# Patient Record
Sex: MALE
[De-identification: names, ages, dates, MRNs, and addresses within clinical notes are randomized per-mention and may not be internally consistent; named-entity substitution may affect disease eponyms.]

---

## 2019-01-09 PROBLEM — Z00.00 ENCOUNTER FOR PREVENTIVE HEALTH EXAMINATION: Status: ACTIVE | Noted: 2019-01-09

## 2019-01-18 ENCOUNTER — APPOINTMENT (OUTPATIENT)
Dept: VASCULAR SURGERY | Facility: CLINIC | Age: 54
End: 2019-01-18
Payer: COMMERCIAL

## 2019-01-18 VITALS
DIASTOLIC BLOOD PRESSURE: 80 MMHG | SYSTOLIC BLOOD PRESSURE: 110 MMHG | HEIGHT: 74 IN | BODY MASS INDEX: 30.16 KG/M2 | WEIGHT: 235 LBS

## 2019-01-18 DIAGNOSIS — F17.200 NICOTINE DEPENDENCE, UNSPECIFIED, UNCOMPLICATED: ICD-10-CM

## 2019-01-18 PROCEDURE — 93978 VASCULAR STUDY: CPT

## 2019-01-18 PROCEDURE — 99203 OFFICE O/P NEW LOW 30 MIN: CPT

## 2019-01-18 NOTE — HISTORY OF PRESENT ILLNESS
[FreeTextEntry1] : 52 y/o gentleman with erectile dysfunction for the past 5 years, never been evaluated by a Urologist, sent in by PMD for vascular evaluation.

## 2019-01-18 NOTE — DATA REVIEWED
[FreeTextEntry1] : I performed an arterial duplex which was medically necessary to evaluate for iliac artery stenosis. It showed patent aorta, iliac arteries and femoral arteries bilaterally.\par

## 2019-01-18 NOTE — ASSESSMENT
[FreeTextEntry1] : 54 y/o gentleman with erectile dysfunction for the past 5 years, never been evaluated by a Urologist, sent in by PMD for vascular evaluation. I performed an arterial duplex which was medically necessary to evaluate for iliac artery stenosis. It showed patent aorta, iliac arteries and femoral arteries bilaterally. I have informed him of the test results and advised Urology consultation. No vascular surgical intervention is needed.\par

## 2019-05-22 ENCOUNTER — APPOINTMENT (OUTPATIENT)
Dept: UROLOGY | Facility: CLINIC | Age: 54
End: 2019-05-22
Payer: COMMERCIAL

## 2019-05-22 VITALS
WEIGHT: 235 LBS | HEIGHT: 74 IN | DIASTOLIC BLOOD PRESSURE: 72 MMHG | HEART RATE: 104 BPM | BODY MASS INDEX: 30.16 KG/M2 | SYSTOLIC BLOOD PRESSURE: 114 MMHG

## 2019-05-22 PROCEDURE — 99203 OFFICE O/P NEW LOW 30 MIN: CPT

## 2019-05-22 RX ORDER — HYDROCHLOROTHIAZIDE 25 MG/1
25 TABLET ORAL
Qty: 90 | Refills: 0 | Status: ACTIVE | COMMUNITY
Start: 2018-12-13

## 2019-05-22 NOTE — LETTER BODY
[Dear  ___] : Dear  [unfilled], [Consult Letter:] : I had the pleasure of evaluating your patient, [unfilled]. [Please see my note below.] : Please see my note below. [Consult Closing:] : Thank you very much for allowing me to participate in the care of this patient.  If you have any questions, please do not hesitate to contact me. [Sincerely,] : Sincerely, [FreeTextEntry2] : Dr. Orion Garcia\par [address]\[place] [address]

## 2019-05-22 NOTE — PHYSICAL EXAM
[General Appearance - Well Developed] : well developed [General Appearance - Well Nourished] : well nourished [Normal Appearance] : normal appearance [Well Groomed] : well groomed [General Appearance - In No Acute Distress] : no acute distress [Edema] : no peripheral edema [Respiration, Rhythm And Depth] : normal respiratory rhythm and effort [Exaggerated Use Of Accessory Muscles For Inspiration] : no accessory muscle use [Abdomen Soft] : soft [Abdomen Tenderness] : non-tender [Costovertebral Angle Tenderness] : no ~M costovertebral angle tenderness [Urethral Meatus] : meatus normal [Urinary Bladder Findings] : the bladder was normal on palpation [Scrotum] : the scrotum was normal [Testes Mass (___cm)] : there were no testicular masses [No Prostate Nodules] : no prostate nodules [Normal Station and Gait] : the gait and station were normal for the patient's age [] : no rash [No Focal Deficits] : no focal deficits [Oriented To Time, Place, And Person] : oriented to person, place, and time [Affect] : the affect was normal [Mood] : the mood was normal [Not Anxious] : not anxious [No Palpable Adenopathy] : no palpable adenopathy [Penis Abnormality] : normal circumcised penis [Testes Tenderness] : no tenderness of the testes [Anus Abnormality] : the anus and perineum were normal [Rectal Exam - Rectum] : digital rectal exam was normal [Prostate Enlargement] : the prostate was not enlarged [Prostate Tenderness] : the prostate was not tender [Prostate Size ___ gm] : prostate size [unfilled] gm [FreeTextEntry1] : 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

## 2019-05-22 NOTE — LETTER HEADER
[FreeTextEntry3] : Brandon Fall M.D.\par Director of Urology\par [facility]\par [address] [place] [address], [address]\[place] [address]

## 2019-05-22 NOTE — HISTORY OF PRESENT ILLNESS
[Erectile Dysfunction] : Erectile Dysfunction [None] : None [FreeTextEntry1] : Jarod is a 54-year-old male who presents for evaluation of worsening erectile dysfunction over the last 13 years.\par \par He is experiencing with difficulty with both obtaining and maintaining an erection. Additionally he reports decreased libido and energy. He has tried Viagra in the past with some relief however, he still had early detumescence and then eventually even the limited rigidity he was getting stopped. He stopped trying get after three or four times at the hundred milligrams per dose.\par \par There was a question of iliac artery stenosis and he was seen by vascular which ruled that out.\par \par He presents today for evaluation\par  [Currently Experiencing ___] :  [unfilled]

## 2019-05-22 NOTE — ASSESSMENT
[FreeTextEntry1] : Physical examination Shows penile atrophy with some Peyronie's. He has tried sildenafil years ago with minimal improvement. He needs a complete hormonal panel and [place] criteria.\par \par If his hormone panel is within normal limits we will consider penile Doppler for further evaluation.

## 2019-07-29 ENCOUNTER — APPOINTMENT (OUTPATIENT)
Dept: UROLOGY | Facility: CLINIC | Age: 54
End: 2019-07-29
Payer: COMMERCIAL

## 2019-07-29 PROCEDURE — 99213 OFFICE O/P EST LOW 20 MIN: CPT

## 2019-07-29 NOTE — PHYSICAL EXAM
[General Appearance - Well Developed] : well developed [General Appearance - Well Nourished] : well nourished [Normal Appearance] : normal appearance [Well Groomed] : well groomed [General Appearance - In No Acute Distress] : no acute distress [Edema] : no peripheral edema [] : no respiratory distress [Respiration, Rhythm And Depth] : normal respiratory rhythm and effort [Exaggerated Use Of Accessory Muscles For Inspiration] : no accessory muscle use [Oriented To Time, Place, And Person] : oriented to person, place, and time [Affect] : the affect was normal [Normal Station and Gait] : the gait and station were normal for the patient's age [Not Anxious] : not anxious [Mood] : the mood was normal [No Focal Deficits] : no focal deficits

## 2019-07-29 NOTE — LETTER HEADER
[FreeTextEntry3] : Brandon Fall M.D.\par Director of Urology\par [facility]\par [address] [place] [address], [address]\[place] [address]

## 2019-07-29 NOTE — LETTER BODY
[Dear  ___] : Dear  [unfilled], [Courtesy Letter:] : I had the pleasure of seeing your patient, [unfilled], in my office today. [Please see my note below.] : Please see my note below. [Sincerely,] : Sincerely, [Consult Closing:] : Thank you very much for allowing me to participate in the care of this patient.  If you have any questions, please do not hesitate to contact me. [FreeTextEntry2] : Dr. Orion Garcia\par [address]\[place] [address]

## 2019-07-29 NOTE — ASSESSMENT
[FreeTextEntry1] : He is hypogonadal however this is a single sample and as we explained to him insurance companies won’t pay and I think rightfully so it must be documented is not a sampling error. In addition he needs to get [place] criteria classification, not the study results that were done but the interpretation thereof. I did time he gets one he will hopefully have the other and then we can decide on therapy. He understands that if he is hypogonadal the PDE five inhibitors don’t work as well and that may be the reason for his progressive failure. However they can be true true and nonrelated and if we make him into a normal testosterone level patient and the PDE five inhibitors still don’t work he will have to consider alternative options.

## 2019-07-29 NOTE — HISTORY OF PRESENT ILLNESS
[Currently Experiencing ___] :  [unfilled] [Erectile Dysfunction] : Erectile Dysfunction [None] : None [FreeTextEntry1] : Jarod is a 54-year-old male who we saw for worsening erectile dysfunction over the last 13 years.\par \par He is experiencing with difficulty with both obtaining and maintaining an erection with decreased libido and energy. He has tried Viagra in the past with some relief however, he still had early detumescence and then eventually even the limited rigidity he was getting stopped. He stopped trying after three or four attempts at the hundred milligrams dose.\par \par He presents today to review his blood work. He did not Get [place] criteria, although he did have a full work up by cardiology.\par

## 2019-10-16 ENCOUNTER — APPOINTMENT (OUTPATIENT)
Dept: UROLOGY | Facility: CLINIC | Age: 54
End: 2019-10-16
Payer: COMMERCIAL

## 2019-10-16 VITALS
DIASTOLIC BLOOD PRESSURE: 91 MMHG | HEIGHT: 74 IN | BODY MASS INDEX: 30.16 KG/M2 | SYSTOLIC BLOOD PRESSURE: 132 MMHG | WEIGHT: 235 LBS | HEART RATE: 105 BPM

## 2019-10-16 PROCEDURE — 99213 OFFICE O/P EST LOW 20 MIN: CPT

## 2019-10-16 NOTE — ASSESSMENT
[FreeTextEntry1] : His hormones are low on a second round of testing and we reviewed all the options available. He is electing to begin testosterone therapy. We discussed different modalities and he is electing for gel. He will begin AndroGel 1.62% @ 2 pumps per day. He will get blood work 3 weeks after starting the medication and follow up the week after for review. Additionally he will begin sildenafil 100 mg p.o. as needed one hour prior to intercourse after giving the gel a week or two to get to a good blood level. All usage, dosage, mechanism of action, and adverse events were reviewed. He understands not take more than one tablet in a 24-hour period.

## 2019-10-16 NOTE — PHYSICAL EXAM

## 2019-10-16 NOTE — HISTORY OF PRESENT ILLNESS
[Currently Experiencing ___] :  [unfilled] [Erectile Dysfunction] : Erectile Dysfunction [None] : None [FreeTextEntry1] : Jarod is a 54-year-old male who we have been following for worsening erectile dysfunction over the last 13 years and low testosterone.\par \par He has difficulty with both obtaining and maintaining an erection with decreased libido and energy. He has tried Viagra 100 mg without success. His last blood work showed low testosterone levels and he presents today to review his repeat blood work.\par \par Additionally he has his [place] criteria for review.

## 2019-10-16 NOTE — LETTER HEADER
[FreeTextEntry3] : Brandon Fall M.D.\par Director of Urology\par [facility]\par [address] [place] [address], [address]\[place] [address]

## 2019-10-16 NOTE — LETTER BODY
[Dear  ___] : Dear  [unfilled], [Courtesy Letter:] : I had the pleasure of seeing your patient, [unfilled], in my office today. [Please see my note below.] : Please see my note below. [Consult Closing:] : Thank you very much for allowing me to participate in the care of this patient.  If you have any questions, please do not hesitate to contact me. [Sincerely,] : Sincerely, [FreeTextEntry2] : Dr. Orion Garcia\par [address]\[place] [address]

## 2019-11-13 ENCOUNTER — APPOINTMENT (OUTPATIENT)
Dept: UROLOGY | Facility: CLINIC | Age: 54
End: 2019-11-13
Payer: COMMERCIAL

## 2019-11-13 VITALS
BODY MASS INDEX: 30.16 KG/M2 | WEIGHT: 235 LBS | HEART RATE: 111 BPM | SYSTOLIC BLOOD PRESSURE: 136 MMHG | DIASTOLIC BLOOD PRESSURE: 87 MMHG | HEIGHT: 74 IN

## 2019-11-13 PROCEDURE — 99213 OFFICE O/P EST LOW 20 MIN: CPT

## 2019-11-13 NOTE — HISTORY OF PRESENT ILLNESS
[FreeTextEntry1] : Jarod was started on AndroGel he was supposed to get blood drawn in three weeks and see me in four he had blood drawn November 9 is now here for days later. Please note he is not try the sildenafil though he’s already been using the AndroGel for several weeks. As he put it in his wife agreed she just wasn’t available as she wasn’t feeling that well.

## 2019-11-13 NOTE — LETTER HEADER
[FreeTextEntry3] : Brandon Fall M.D.\par Director of Urology\par [facility]\par [address] [place] [address], [address]\[place] [address]

## 2019-11-13 NOTE — ASSESSMENT
[FreeTextEntry1] : Physical exam shows no signs of fluid retention\par He is going away until Monday. He will call us that and depending on the testosterone levels we may decide to repeat the bloods or he may just stay at two pumps per day. His wife’s tells me now that she’s feeling better enough that they can try sexual activity I Jarod doesn’t know how much that will be true but if it’s so will try. Why now giving him an appointment for a month from now if the testosterone levels are good will just a on two pumps per day will see if we can get him a three-month supply if the sildenafil works great if not will look at other options.\par

## 2019-11-13 NOTE — LETTER BODY
[Dear  ___] : Dear  [unfilled], [Please see my note below.] : Please see my note below. [Courtesy Letter:] : I had the pleasure of seeing your patient, [unfilled], in my office today. [FreeTextEntry2] : Dr. Orion Garcia\par [address]\[place] [address] [Sincerely,] : Sincerely,

## 2019-11-13 NOTE — PHYSICAL EXAM
[Normal Appearance] : normal appearance [General Appearance - Well Nourished] : well nourished [General Appearance - Well Developed] : well developed [Well Groomed] : well groomed [General Appearance - In No Acute Distress] : no acute distress [Abdomen Soft] : soft [Abdomen Tenderness] : non-tender [Costovertebral Angle Tenderness] : no ~M costovertebral angle tenderness [Heart Rate And Rhythm] : Heart rate and rhythm were normal [FreeTextEntry1] : mild edema [] : no respiratory distress [Respiration, Rhythm And Depth] : normal respiratory rhythm and effort [Exaggerated Use Of Accessory Muscles For Inspiration] : no accessory muscle use [Auscultation Breath Sounds / Voice Sounds] : lungs were clear to auscultation bilaterally [Oriented To Time, Place, And Person] : oriented to person, place, and time [Not Anxious] : not anxious [Affect] : the affect was normal [Mood] : the mood was normal [Normal Station and Gait] : the gait and station were normal for the patient's age

## 2019-12-11 ENCOUNTER — APPOINTMENT (OUTPATIENT)
Dept: UROLOGY | Facility: CLINIC | Age: 54
End: 2019-12-11

## 2019-12-27 ENCOUNTER — APPOINTMENT (OUTPATIENT)
Dept: UROLOGY | Facility: CLINIC | Age: 54
End: 2019-12-27
Payer: COMMERCIAL

## 2019-12-27 VITALS
BODY MASS INDEX: 30.16 KG/M2 | HEIGHT: 74 IN | HEART RATE: 104 BPM | WEIGHT: 235 LBS | SYSTOLIC BLOOD PRESSURE: 141 MMHG | DIASTOLIC BLOOD PRESSURE: 93 MMHG

## 2019-12-27 PROCEDURE — 99213 OFFICE O/P EST LOW 20 MIN: CPT

## 2019-12-27 RX ORDER — SILDENAFIL 100 MG/1
100 TABLET, FILM COATED ORAL
Qty: 10 | Refills: 2 | Status: COMPLETED | OUTPATIENT
Start: 2019-10-16 | End: 2019-12-27

## 2019-12-27 NOTE — LETTER HEADER
[FreeTextEntry3] : Brandon Fall M.D.\par Director of Urology\par [facility]\par [address] [place] [address], [address]\[place] [address]

## 2019-12-27 NOTE — HISTORY OF PRESENT ILLNESS
[FreeTextEntry1] : Jarod is a 54-year-old [place] American male last seen on November 13. At that point he had been on AndroGel we did not have all the bloods back he was going to continue with two pumps per day contact us a few days later for levels were good enough try the sildenafil several times going up to 100 mg. He’s done that his blood levels were good, please see the results section, and he says he with the hundred milligrams of sildenafil the penis is not working. His libido is good he enjoys having his genitalia played with he gets semi hard but it’s not enough to use.\par \par He also complains of some puffiness where he’s putting the AndroGel, please note he’s putting it on the anterior upper chest wall not his shoulders and wonders what’s going on.\par  [Currently Experiencing ___] :  [unfilled] [Erectile Dysfunction] : Erectile Dysfunction [None] : None

## 2019-12-27 NOTE — ASSESSMENT
[FreeTextEntry1] : Physical exam shows no palpable or visible abnormality in his chest wall it’s not the breast tissue is not worried about that it’s in the area between the breast and the shoulder. I don’t see anything and he acknowledges his wife’s looked at it and she doesn’t see any different. I did correct where he supposed to put the AndroGel and will see if that makes any difference in his lab values but they may question is what to do about his penis.\par \par He could try other PD five inhibitors though I haven’t seen one work better than the other if we give him Cialis and it works that’s a lot better than alternatives. We will try that having them get it from men M.D. because it’s the least expensive method I have of getting it unless he has an insurance that pays for it, he will let us know if that works if it does all well and good if it doesn’t work then he’d have to consider injection therapy a vacuum device or an implant.\par

## 2019-12-27 NOTE — PHYSICAL EXAM
[Abdomen Soft] : soft [Abdomen Tenderness] : non-tender [] : no respiratory distress [Costovertebral Angle Tenderness] : no ~M costovertebral angle tenderness [Oriented To Time, Place, And Person] : oriented to person, place, and time [Respiration, Rhythm And Depth] : normal respiratory rhythm and effort [Exaggerated Use Of Accessory Muscles For Inspiration] : no accessory muscle use [Not Anxious] : not anxious [Mood] : the mood was normal [Affect] : the affect was normal [Normal Station and Gait] : the gait and station were normal for the patient's age

## 2019-12-27 NOTE — LETTER BODY
[Dear  ___] : Dear  [unfilled], [Please see my note below.] : Please see my note below. [Courtesy Letter:] : I had the pleasure of seeing your patient, [unfilled], in my office today. [Consult Closing:] : Thank you very much for allowing me to participate in the care of this patient.  If you have any questions, please do not hesitate to contact me. [Sincerely,] : Sincerely, [FreeTextEntry2] : Dr. Orion Garcia\par [address]\[place] [address]

## 2020-02-26 ENCOUNTER — APPOINTMENT (OUTPATIENT)
Dept: UROLOGY | Facility: CLINIC | Age: 55
End: 2020-02-26
Payer: COMMERCIAL

## 2020-02-26 VITALS
BODY MASS INDEX: 30.16 KG/M2 | DIASTOLIC BLOOD PRESSURE: 83 MMHG | HEIGHT: 74 IN | WEIGHT: 235 LBS | SYSTOLIC BLOOD PRESSURE: 147 MMHG | HEART RATE: 111 BPM

## 2020-02-26 PROCEDURE — 99213 OFFICE O/P EST LOW 20 MIN: CPT

## 2020-04-29 ENCOUNTER — APPOINTMENT (OUTPATIENT)
Dept: UROLOGY | Facility: CLINIC | Age: 55
End: 2020-04-29
Payer: COMMERCIAL

## 2020-04-29 VITALS
DIASTOLIC BLOOD PRESSURE: 89 MMHG | SYSTOLIC BLOOD PRESSURE: 134 MMHG | HEIGHT: 74 IN | WEIGHT: 235 LBS | HEART RATE: 111 BPM | BODY MASS INDEX: 30.16 KG/M2 | TEMPERATURE: 98 F

## 2020-04-29 PROCEDURE — 99213 OFFICE O/P EST LOW 20 MIN: CPT

## 2020-04-29 NOTE — LETTER HEADER
[FreeTextEntry3] : Brandon Fall M.D.\par Director of Urology\par [facility]\par [address] [place] [address], [address]\[place] [address]

## 2020-04-29 NOTE — PHYSICAL EXAM
[General Appearance - Well Developed] : well developed [Well Groomed] : well groomed [Normal Appearance] : normal appearance [General Appearance - Well Nourished] : well nourished [FreeTextEntry1] : miold obesity [General Appearance - In No Acute Distress] : no acute distress [Abdomen Tenderness] : non-tender [Abdomen Soft] : soft [Costovertebral Angle Tenderness] : no ~M costovertebral angle tenderness [Edema] : no peripheral edema [] : no respiratory distress [Exaggerated Use Of Accessory Muscles For Inspiration] : no accessory muscle use [Respiration, Rhythm And Depth] : normal respiratory rhythm and effort [Oriented To Time, Place, And Person] : oriented to person, place, and time [Affect] : the affect was normal [Mood] : the mood was normal [Not Anxious] : not anxious [Normal Station and Gait] : the gait and station were normal for the patient's age

## 2020-04-29 NOTE — ASSESSMENT
[FreeTextEntry1] : His physical exam shows no signs of fluid retention these numbers don’t make sense I renewed the medication racing him to four pumps per day but he’s can get blood drawn tomorrow morning still on two pumps per day and will have it telemedicine visit after the bloods come back.

## 2020-04-29 NOTE — HISTORY OF PRESENT ILLNESS
[Currently Experiencing ___] :  [unfilled] [FreeTextEntry1] : Jarod is a 54-year-old -American male who was last seen on [place] 26, 2020. We’ve been treating for erectile dysfunction and low testosterone we had put him on AndroGel at two pumps per day and bloods in November 2019 at show decent values at 526, 80.6 and 148 with an estradiol of 44. We would continuing the medication to see how he was doing just to make sure he didn’t need a little bit more and then if he still didn’t respond to PDE five inhibitors we go for a Doppler. We had wanted him to repeat the bloods for the visit in February but he forgot so we figured we see in the next time. The visit was delayed because of the covid pandemic and is here today.\par \par He had bloods done on March 24, 2020 telling me he is still using two pumps per day. He feels no change in libido on his penis still doesn’t work. He is here for review as he told me he could not to Tele med.\par  [None] : None [Erectile Dysfunction] : Erectile Dysfunction

## 2020-04-29 NOTE — LETTER HEADER
[FreeTextEntry3] : Brandon Fall M.D.\par Director of Urology\par [facility]\par [address] [place] [address], [address]\[place] [address]

## 2020-04-29 NOTE — HISTORY OF PRESENT ILLNESS
[Currently Experiencing ___] :  [unfilled] [Erectile Dysfunction] : Erectile Dysfunction [None] : None [FreeTextEntry1] : Jarod is a 54-year-old male who had been following for hypogonadism and erectile dysfunction. He has been on AndroGel 2 pumps daily with some improvement in his libido/energy. He did not obtain repeat blood work. At his last visit we gave him Cialis 20 mg and he reports no significant improvement in his erectile quality on both AndroGel and Cialis.\par \par \par

## 2020-04-29 NOTE — LETTER BODY
[Dear  ___] : Dear  [unfilled], [Courtesy Letter:] : I had the pleasure of seeing your patient, [unfilled], in my office today. [Please see my note below.] : Please see my note below. [Consult Closing:] : Thank you very much for allowing me to participate in the care of this patient.  If you have any questions, please do not hesitate to contact me. [Sincerely,] : Sincerely, [FreeTextEntry2] : Dr. Orion Garcia\par [address]\[place] [address]

## 2020-04-29 NOTE — ASSESSMENT
[FreeTextEntry1] : He will get blood work to see how his hormones are on 2 puffs of AndroGel.\par \par Concerning his ED, it is refractory to PDE 5 inhibitors and we will obtain a Doppler study for further evaluation.

## 2020-04-29 NOTE — LETTER BODY
[Dear  ___] : Dear  [unfilled], [Courtesy Letter:] : I had the pleasure of seeing your patient, [unfilled], in my office today. [Please see my note below.] : Please see my note below. [FreeTextEntry2] : Dr. Orion Garcia\par [address]\[place] [address] [Sincerely,] : Sincerely,

## 2020-05-06 DIAGNOSIS — R68.82 DECREASED LIBIDO: ICD-10-CM

## 2020-05-06 DIAGNOSIS — E28.0 ESTROGEN EXCESS: ICD-10-CM

## 2020-05-06 DIAGNOSIS — R79.89 OTHER SPECIFIED ABNORMAL FINDINGS OF BLOOD CHEMISTRY: ICD-10-CM

## 2020-05-07 ENCOUNTER — APPOINTMENT (OUTPATIENT)
Dept: UROLOGY | Facility: CLINIC | Age: 55
End: 2020-05-07
Payer: COMMERCIAL

## 2020-05-07 PROBLEM — R79.89 DECREASED TESTOSTERONE LEVEL: Status: ACTIVE | Noted: 2019-07-29

## 2020-05-07 PROBLEM — E28.0 ESTRADIOL EXCESS: Status: ACTIVE | Noted: 2020-05-07

## 2020-05-07 PROBLEM — R68.82 DECREASED LIBIDO: Status: ACTIVE | Noted: 2019-05-22

## 2020-05-07 PROCEDURE — 99214 OFFICE O/P EST MOD 30 MIN: CPT | Mod: 95

## 2020-05-07 NOTE — ASSESSMENT
[FreeTextEntry1] : He tells me that he continued with the AndroGel he tried with the PD five inhibitors and failed.\par \par Because of the elevated estradiol and the fact that I do want to stop the testosterone as it is not that high but the estradiol is high that I want to leave and we will add in Arimidex at a half milligrams PO three times per week in what he understands is an off label use. He will continue the AndroGel, [facility] he gave me when he was here was an era and though he has incontinence yet he has a few more days left and he will contact us for [facility] tomorrow. He will get blood in three weeks and follow up in four so we could see how he is doing on the Arimidex and at that time hopefully the pandemic will have alleviated enough that we can proceed with a Doppler. He is aware that his partner should be available as number one if it works and he has a long-lasting erection he hasn’t had good sex in a while and why waste it. Number two, if it doesn’t work as well as we’d like it may work better when he goes home and has foreplay with his wife rather than having a test here with me. He is aware that he may get an erection that lasts too long and have to come back for reversal.

## 2020-05-07 NOTE — HISTORY OF PRESENT ILLNESS
[FreeTextEntry2] : he has received, reviewed and agreed to the telemedicine consent  [FreeTextEntry1] : We communicated with him via his wifes cell [phone]-for the am well (we had his email- [email or web address] but he did not have a laptop with audiovisual or desktop with audiovisual capacity) but when the audio did not work we switch to audio via His cell [phone] leaving the video on hers\par \par Jarod is a 54-year-old -American male last seen April 29. We been treating him for erectile dysfunction and low testosterone with AndroGel at two pumps per day and we had reissued him PD five inhibitors. He was supposed to get blood test so we could see if he had normalized his testosterone and kept it there and if so was a failure PD five inhibitors or if the levels have dropped down and we needed to adjust the dose. The blood test results made no sense so we arranged to have them drawn the next day he would continue on two pumps per day and we would have telemedicine after the bloods came back.

## 2020-05-07 NOTE — LETTER HEADER
[FreeTextEntry3] : Brandon Fall M.D.\par Director of Urology\par [facility]\par [address] [place] [address], [address]\[place] [address]

## 2020-06-04 ENCOUNTER — APPOINTMENT (OUTPATIENT)
Dept: UROLOGY | Facility: CLINIC | Age: 55
End: 2020-06-04

## 2020-06-23 RX ORDER — ANASTROZOLE TABLETS 1 MG/1
1 TABLET ORAL
Qty: 68 | Refills: 0 | Status: ACTIVE | COMMUNITY
Start: 2020-05-07 | End: 1900-01-01

## 2020-08-26 ENCOUNTER — APPOINTMENT (OUTPATIENT)
Dept: UROLOGY | Facility: CLINIC | Age: 55
End: 2020-08-26

## 2020-10-05 RX ORDER — TESTOSTERONE 16.2 MG/G
20.25 MG/ACT GEL TRANSDERMAL
Qty: 6 | Refills: 0 | Status: ACTIVE | COMMUNITY
Start: 2019-10-16 | End: 1900-01-01

## 2020-11-17 ENCOUNTER — RX RENEWAL (OUTPATIENT)
Age: 55
End: 2020-11-17

## 2020-12-09 ENCOUNTER — APPOINTMENT (OUTPATIENT)
Dept: UROLOGY | Facility: CLINIC | Age: 55
End: 2020-12-09

## 2021-04-12 ENCOUNTER — APPOINTMENT (OUTPATIENT)
Dept: UROLOGY | Facility: CLINIC | Age: 56
End: 2021-04-12

## 2021-06-14 ENCOUNTER — APPOINTMENT (OUTPATIENT)
Dept: UROLOGY | Facility: CLINIC | Age: 56
End: 2021-06-14

## 2021-09-26 NOTE — REASON FOR VISIT
b/l LE grossly >3/5 secondary to ability to STS and ambulate [Follow-up Visit ___] : a follow-up visit  for [unfilled]

## 2022-07-28 ENCOUNTER — APPOINTMENT (OUTPATIENT)
Dept: CARDIOLOGY | Facility: CLINIC | Age: 57
End: 2022-07-28

## 2022-07-28 VITALS
BODY MASS INDEX: 29 KG/M2 | SYSTOLIC BLOOD PRESSURE: 124 MMHG | WEIGHT: 226 LBS | DIASTOLIC BLOOD PRESSURE: 70 MMHG | TEMPERATURE: 98.7 F | HEART RATE: 107 BPM | HEIGHT: 74 IN

## 2022-07-28 DIAGNOSIS — R94.31 ABNORMAL ELECTROCARDIOGRAM [ECG] [EKG]: ICD-10-CM

## 2022-07-28 DIAGNOSIS — I10 ESSENTIAL (PRIMARY) HYPERTENSION: ICD-10-CM

## 2022-07-28 DIAGNOSIS — R01.1 CARDIAC MURMUR, UNSPECIFIED: ICD-10-CM

## 2022-07-28 DIAGNOSIS — R00.0 TACHYCARDIA, UNSPECIFIED: ICD-10-CM

## 2022-07-28 DIAGNOSIS — N52.9 MALE ERECTILE DYSFUNCTION, UNSPECIFIED: ICD-10-CM

## 2022-07-28 DIAGNOSIS — E78.00 PURE HYPERCHOLESTEROLEMIA, UNSPECIFIED: ICD-10-CM

## 2022-07-28 PROCEDURE — 93000 ELECTROCARDIOGRAM COMPLETE: CPT

## 2022-07-28 PROCEDURE — 99204 OFFICE O/P NEW MOD 45 MIN: CPT

## 2022-07-28 RX ORDER — TADALAFIL 5 MG/1
5 TABLET, FILM COATED ORAL
Refills: 0 | Status: ACTIVE | COMMUNITY

## 2022-07-28 RX ORDER — GEMFIBROZIL 600 MG/1
600 TABLET, FILM COATED ORAL TWICE DAILY
Refills: 0 | Status: ACTIVE | COMMUNITY
Start: 2019-01-03

## 2022-07-28 RX ORDER — TRAZODONE HYDROCHLORIDE 50 MG/1
50 TABLET ORAL
Qty: 30 | Refills: 0 | Status: DISCONTINUED | COMMUNITY
Start: 2019-02-23 | End: 2022-07-28

## 2022-07-28 RX ORDER — TAMSULOSIN HYDROCHLORIDE 0.4 MG/1
0.4 CAPSULE ORAL
Qty: 90 | Refills: 0 | Status: DISCONTINUED | COMMUNITY
Start: 2018-12-19 | End: 2022-07-28

## 2022-07-28 RX ORDER — TADALAFIL 5 MG/1
5 TABLET ORAL
Qty: 180 | Refills: 0 | Status: ACTIVE | COMMUNITY
Start: 2022-07-07

## 2022-07-28 RX ORDER — ALBUTEROL SULFATE 90 UG/1
108 (90 BASE) AEROSOL, METERED RESPIRATORY (INHALATION)
Qty: 8 | Refills: 0 | Status: DISCONTINUED | COMMUNITY
Start: 2018-12-13 | End: 2022-07-28

## 2022-07-28 RX ORDER — OFLOXACIN 3 MG/ML
0.3 SOLUTION/ DROPS OPHTHALMIC
Qty: 5 | Refills: 0 | Status: ACTIVE | COMMUNITY
Start: 2022-06-29

## 2022-07-28 RX ORDER — AMLODIPINE BESYLATE 10 MG/1
10 TABLET ORAL DAILY
Refills: 0 | Status: ACTIVE | COMMUNITY
Start: 2018-12-13

## 2022-07-28 RX ORDER — TADALAFIL 20 MG/1
20 TABLET ORAL
Qty: 10 | Refills: 0 | Status: DISCONTINUED | OUTPATIENT
Start: 2019-12-27 | End: 2022-07-28

## 2022-07-29 NOTE — PHYSICAL EXAM

## 2022-07-29 NOTE — REVIEW OF SYSTEMS
[Erectile Dysfunction] : erectile dysfunction [Negative] : Heme/Lymph [FreeTextEntry5] : Tachycardia, exertional dyspnea

## 2022-07-29 NOTE — HISTORY OF PRESENT ILLNESS
[FreeTextEntry1] : Hypertension for 20 years\par Hyperlipidemia\par Active smoker patient is currently smoking cigars.\par He was told that he has valvular heart disease, by another Cardiologist.\par He was unsure of which valve is regurgitating, but he had an echo doppler in the past and cannot recall the details or where the study was performed.\par Patient denies a history of MI, angina, CHF, arrhythmia, TIA, CVA, syncope, or familial history of heart disease.\par PSurgHx: Intestinal mass resection at [facility] 6 years ago, patient is a poor historian and did not know the reason for the surgery. He was told that the mass was benign.\par The patient does not exercise.\par He had cardiac testing at [facility] when he was placed on testosterone therapy for his ED, but he does not have copies of those reports.\par He had seen Vascular in 2019, Dr. Monge and had an abdominal sono revealing patent aortoiliac arteries.

## 2022-07-29 NOTE — DISCUSSION/SUMMARY
[FreeTextEntry1] : Holter monitor in view of increased heart rate and intermittent palpitations\par Treadmill stress test to exclude myocardial ischemia and to assess functional capacity\par 2D echo doppler.\par Patient was instructed to target his T. Cholesterol to less than 200 mg/dl and LDL cholesterol to less than 100 mg/dl.\par Start a low fat,low cholesterol diet.\par Patient is advised not to discontinue his atenolol, he was prescribed 50 mg daily by his Internist and he is tachycardic on today's office visit with a HR of 107 bpm.\par Maintain cardiac medications. \par Patient was advised to have a BMP, CBC , fasting lipid profile and hepatic panel.\par RV in 2 weeks after above testing.\par

## 2022-07-29 NOTE — REASON FOR VISIT
[FreeTextEntry1] : Patient presents for initial Cardiology evaluation due to a history of valvular disease, hypertension, hyperlipidemia, and a request for cardiac testing due to his erectile dysfunction. He is taking amlodipine and HCTZ to control his BP.  According to his spouse, he is also on atenolol 50 mg daily but it is unclear if he is taking the medication daily as directed.

## 2022-08-10 ENCOUNTER — APPOINTMENT (OUTPATIENT)
Dept: CARDIOLOGY | Facility: CLINIC | Age: 57
End: 2022-08-10

## 2022-08-10 PROCEDURE — 93306 TTE W/DOPPLER COMPLETE: CPT

## 2022-09-15 ENCOUNTER — APPOINTMENT (OUTPATIENT)
Dept: CARDIOLOGY | Facility: CLINIC | Age: 57
End: 2022-09-15

## 2022-10-10 ENCOUNTER — APPOINTMENT (OUTPATIENT)
Dept: CARDIOLOGY | Facility: CLINIC | Age: 57
End: 2022-10-10

## 2024-05-27 NOTE — ASSESSMENT
[FreeTextEntry1] : Sinus tachycardia\par Hypertensive heart disease\par Hyperlipidemia\par smoker\par R/O ASHD\par Erectile dysfunction
No